# Patient Record
Sex: MALE | Race: WHITE | NOT HISPANIC OR LATINO | Employment: FULL TIME | URBAN - METROPOLITAN AREA
[De-identification: names, ages, dates, MRNs, and addresses within clinical notes are randomized per-mention and may not be internally consistent; named-entity substitution may affect disease eponyms.]

---

## 2017-10-06 ENCOUNTER — HOSPITAL ENCOUNTER (OUTPATIENT)
Dept: ULTRASOUND IMAGING | Facility: HOSPITAL | Age: 42
Discharge: HOME | End: 2017-10-06
Attending: INTERNAL MEDICINE | Admitting: INTERNAL MEDICINE

## 2017-10-06 LAB
ALBUMIN SERPL-MCNC: 3.8 G/DL (ref 3.5–4.8)
ALBUMIN/GLOB SERPL: 1.1 {RATIO} (ref 1–1.7)
ALP SERPL-CCNC: 60 IU/L (ref 32–91)
ALT SERPL-CCNC: 227 IU/L (ref 17–63)
ANA SER QL IA: NORMAL
ANION GAP SERPL CALC-SCNC: 13.1 MMOL/L (ref 10–20)
AST SERPL-CCNC: 210 IU/L (ref 15–41)
BASOPHILS # BLD AUTO: 0.1 10*3/UL (ref 0–0.2)
BASOPHILS NFR BLD AUTO: 1 % (ref 0–2)
BILIRUB SERPL-MCNC: 0.9 MG/DL (ref 0.3–1.2)
BUN SERPL-MCNC: 9 MG/DL (ref 8–20)
BUN/CREAT SERPL: 10 (ref 6.2–20.3)
CALCIUM SERPL-MCNC: 9.5 MG/DL (ref 8.9–10.3)
CERULOPLASMIN SERPL-MCNC: 42 MG/DL (ref 22–58)
CHLORIDE SERPL-SCNC: 103 MMOL/L (ref 101–111)
CONV AFP: 3 NG/ML (ref 0–9)
CONV CO2: 26 MMOL/L (ref 22–32)
CONV TOTAL PROTEIN: 7.3 G/DL (ref 6.1–7.9)
CREAT UR-MCNC: 0.9 MG/DL (ref 0.7–1.2)
DIFFERENTIAL METHOD BLD: (no result)
EOSINOPHIL # BLD AUTO: 0.3 10*3/UL (ref 0–0.3)
EOSINOPHIL # BLD AUTO: 3 % (ref 0–3)
ERYTHROCYTE [DISTWIDTH] IN BLOOD BY AUTOMATED COUNT: 12.7 % (ref 11.5–14.5)
GLOBULIN UR ELPH-MCNC: 3.5 G/DL (ref 2.5–3.8)
GLUCOSE SERPL-MCNC: 108 MG/DL (ref 65–99)
HAV IGM SERPL QL IA: NONREACTIVE
HBV CORE IGM SERPL QL IA: NONREACTIVE
HBV SURFACE AG SERPL QL IA: NONREACTIVE
HCT VFR BLD AUTO: 43.8 % (ref 40–54)
HCV AB SER DONR QL: NORMAL
HCV AB SER DONR QL: NORMAL
HGB BLD-MCNC: 14.7 G/DL (ref 14–18)
LYMPHOCYTES # BLD AUTO: 2.1 10*3/UL (ref 0.8–4.8)
LYMPHOCYTES NFR BLD AUTO: 24 % (ref 18–42)
MCH RBC QN AUTO: 32.3 PG (ref 26–32)
MCHC RBC AUTO-ENTMCNC: 33.6 G/DL (ref 32–36)
MCV RBC AUTO: 96.2 FL (ref 80–94)
MONOCYTES # BLD AUTO: 0.8 10*3/UL (ref 0.1–1.3)
MONOCYTES NFR BLD AUTO: 9 % (ref 2–11)
NEUTROPHILS # BLD AUTO: 5.6 10*3/UL (ref 2.3–8.6)
NEUTROPHILS NFR BLD AUTO: 63 % (ref 50–75)
NRBC BLD AUTO-RTO: 0 /100{WBCS}
NRBC/RBC NFR BLD MANUAL: 0 10*3/UL
PLATELET # BLD AUTO: 267 10*3/UL (ref 150–450)
PMV BLD AUTO: 8.5 FL (ref 7.4–10.4)
POTASSIUM SERPL-SCNC: 4.1 MMOL/L (ref 3.6–5.1)
RBC # BLD AUTO: 4.55 10*6/UL (ref 4.6–6)
SODIUM SERPL-SCNC: 138 MMOL/L (ref 136–144)
WBC # BLD AUTO: 9 10*3/UL (ref 4.5–11.5)

## 2017-10-17 ENCOUNTER — HOSPITAL ENCOUNTER (OUTPATIENT)
Dept: OTHER | Facility: HOSPITAL | Age: 42
Setting detail: SPECIMEN
Discharge: HOME | End: 2017-10-17
Attending: INTERNAL MEDICINE | Admitting: INTERNAL MEDICINE

## 2017-12-01 ENCOUNTER — HOSPITAL ENCOUNTER (OUTPATIENT)
Dept: LAB | Facility: HOSPITAL | Age: 42
Discharge: HOME | End: 2017-12-01
Attending: INTERNAL MEDICINE | Admitting: INTERNAL MEDICINE

## 2017-12-01 LAB
ALBUMIN SERPL-MCNC: 3.6 G/DL (ref 3.5–4.8)
ALBUMIN/GLOB SERPL: 1 {RATIO} (ref 1–1.7)
ALP SERPL-CCNC: 77 IU/L (ref 32–91)
ALT SERPL-CCNC: 184 IU/L (ref 17–63)
ANION GAP SERPL CALC-SCNC: 11 MMOL/L (ref 10–20)
AST SERPL-CCNC: 140 IU/L (ref 15–41)
BASOPHILS # BLD AUTO: 0.1 10*3/UL (ref 0–0.2)
BASOPHILS NFR BLD AUTO: 1 % (ref 0–2)
BILIRUB SERPL-MCNC: 0.4 MG/DL (ref 0.3–1.2)
BUN SERPL-MCNC: 11 MG/DL (ref 8–20)
BUN/CREAT SERPL: 13.8 (ref 6.2–20.3)
CALCIUM SERPL-MCNC: 9.5 MG/DL (ref 8.9–10.3)
CHLORIDE SERPL-SCNC: 102 MMOL/L (ref 101–111)
CONV CO2: 26 MMOL/L (ref 22–32)
CONV TOTAL PROTEIN: 7.3 G/DL (ref 6.1–7.9)
CREAT UR-MCNC: 0.8 MG/DL (ref 0.7–1.2)
DIFFERENTIAL METHOD BLD: (no result)
EOSINOPHIL # BLD AUTO: 0.3 10*3/UL (ref 0–0.3)
EOSINOPHIL # BLD AUTO: 3 % (ref 0–3)
ERYTHROCYTE [DISTWIDTH] IN BLOOD BY AUTOMATED COUNT: 13.5 % (ref 11.5–14.5)
FERRITIN SERPL-MCNC: 203 NG/ML (ref 24–336)
GLOBULIN UR ELPH-MCNC: 3.7 G/DL (ref 2.5–3.8)
GLUCOSE SERPL-MCNC: 276 MG/DL (ref 65–99)
HCT VFR BLD AUTO: 40.8 % (ref 40–54)
HGB BLD-MCNC: 14 G/DL (ref 14–18)
IRON SERPL-MCNC: 95 UG/DL (ref 45–182)
LYMPHOCYTES # BLD AUTO: 2 10*3/UL (ref 0.8–4.8)
LYMPHOCYTES NFR BLD AUTO: 25 % (ref 18–42)
MCH RBC QN AUTO: 33.5 PG (ref 26–32)
MCHC RBC AUTO-ENTMCNC: 34.5 G/DL (ref 32–36)
MCV RBC AUTO: 97.1 FL (ref 80–94)
MONOCYTES # BLD AUTO: 0.6 10*3/UL (ref 0.1–1.3)
MONOCYTES NFR BLD AUTO: 7 % (ref 2–11)
NEUTROPHILS # BLD AUTO: 5.1 10*3/UL (ref 2.3–8.6)
NEUTROPHILS NFR BLD AUTO: 64 % (ref 50–75)
NRBC BLD AUTO-RTO: 0 /100{WBCS}
NRBC/RBC NFR BLD MANUAL: 0 10*3/UL
PLATELET # BLD AUTO: 237 10*3/UL (ref 150–450)
PMV BLD AUTO: 8.4 FL (ref 7.4–10.4)
POTASSIUM SERPL-SCNC: 4 MMOL/L (ref 3.6–5.1)
RBC # BLD AUTO: 4.2 10*6/UL (ref 4.6–6)
SODIUM SERPL-SCNC: 135 MMOL/L (ref 136–144)
TRANSFERRIN SERPL-MCNC: 284.5 MG/DL (ref 180–330)
WBC # BLD AUTO: 8 10*3/UL (ref 4.5–11.5)

## 2017-12-03 LAB — ENDOMYSIUM IGA SER QL: NEGATIVE

## 2017-12-04 LAB
ANA SER QL IA: NORMAL

## 2018-10-15 ENCOUNTER — HOSPITAL ENCOUNTER (OUTPATIENT)
Dept: URGENT CARE | Facility: CLINIC | Age: 43
Discharge: HOME | End: 2018-10-15
Attending: FAMILY MEDICINE | Admitting: FAMILY MEDICINE

## 2023-09-11 ENCOUNTER — PATIENT ROUNDING (BHMG ONLY) (OUTPATIENT)
Dept: ORTHOPEDIC SURGERY | Facility: CLINIC | Age: 48
End: 2023-09-11
Payer: COMMERCIAL

## 2023-09-11 ENCOUNTER — OFFICE VISIT (OUTPATIENT)
Dept: PODIATRY | Facility: CLINIC | Age: 48
End: 2023-09-11
Payer: COMMERCIAL

## 2023-09-11 VITALS — BODY MASS INDEX: 32.93 KG/M2 | WEIGHT: 230 LBS | RESPIRATION RATE: 20 BRPM | HEIGHT: 70 IN

## 2023-09-11 DIAGNOSIS — M21.862 ACQUIRED POSTERIOR EQUINUS, LEFT: ICD-10-CM

## 2023-09-11 DIAGNOSIS — M79.672 LEFT FOOT PAIN: Primary | ICD-10-CM

## 2023-09-11 DIAGNOSIS — M76.72 PERONEAL TENDINITIS OF LEFT LOWER EXTREMITY: ICD-10-CM

## 2023-09-11 DIAGNOSIS — S92.355D CLOSED NONDISPLACED FRACTURE OF FIFTH METATARSAL BONE OF LEFT FOOT WITH ROUTINE HEALING, SUBSEQUENT ENCOUNTER: ICD-10-CM

## 2023-09-11 DIAGNOSIS — R25.2 CRAMPS OF LEFT LOWER EXTREMITY: ICD-10-CM

## 2023-09-11 PROCEDURE — 99203 OFFICE O/P NEW LOW 30 MIN: CPT | Performed by: PODIATRIST

## 2023-09-11 RX ORDER — CYCLOBENZAPRINE HCL 10 MG
TABLET ORAL
COMMUNITY
Start: 2017-05-27

## 2023-09-11 RX ORDER — IBUPROFEN 800 MG/1
TABLET ORAL
COMMUNITY

## 2023-09-11 RX ORDER — TRAMADOL HYDROCHLORIDE 50 MG/1
TABLET ORAL
COMMUNITY

## 2023-09-11 RX ORDER — PREGABALIN 225 MG/1
CAPSULE ORAL
COMMUNITY
Start: 2021-01-01

## 2023-09-11 RX ORDER — KETOROLAC TROMETHAMINE 5 MG/ML
SOLUTION OPHTHALMIC
COMMUNITY

## 2023-09-11 RX ORDER — HYDROCODONE BITARTRATE AND ACETAMINOPHEN 5; 325 MG/1; MG/1
TABLET ORAL
COMMUNITY

## 2023-09-11 RX ORDER — ATORVASTATIN CALCIUM 40 MG/1
TABLET, FILM COATED ORAL
COMMUNITY

## 2023-09-11 RX ORDER — LISINOPRIL 20 MG/1
20 TABLET ORAL DAILY
COMMUNITY

## 2023-09-11 RX ORDER — INDOMETHACIN 50 MG/1
CAPSULE ORAL
COMMUNITY
Start: 2018-10-15

## 2023-09-11 NOTE — PROGRESS NOTES
A My-Chart message has been sent to the patient for PATIENT ROUNDING with [facility]

## 2023-09-11 NOTE — PROGRESS NOTES
09/11/2023  Foot and Ankle Surgery - New Patient   Provider: Dr. Felipe Currie DPM  Location: [facility]    Subjective:  Negrito Blanca is a 47 y.o. male.     Chief Complaint   Patient presents with    Left Foot - Fracture    Initial Evaluation     LISA godfrey fn 2022  date   unknown       HPI: The patient is a 47-year-old male who presents to the clinic as a new patient for evaluation of issues involving the left foot.    The patient reports he sustained an injury to his left foot on 06/18/2023 when his foot fell asleep on the toilet. He states he stood up and it felt as if his foot twisted inside his shoe. He notes he did not realize his foot was asleep, and he did not fall down. The patient reports he proceeded to urgent care where x-rays were obtained, and he was given a boot. He recalls he was experiencing pain in the area of the fractures while wearing the boot; however, he was advised by orthopedics to continue wearing it. The patient notes he returned to orthopedics approximately 2 weeks ago secondary to continued pain. An MRI scan of his left ankle was obtained at [facility], and he was advised his issues should have improved as he was 6 weeks status post injury. At that time, the patient was advised to wear an ankle brace and return to normal activity. He states he was previously following with Dr. Irwin, who he saw approximately 3 weeks ago. He states he is unaware of any previous issues with his left foot prior to the injury; however, there was possibly evidence of an old fracture noted on x-ray.     Currently, the patient states he has not been ambulating on his left foot at all. He reports, at times, everything will tighten up and feels as if it is being squeezed. He notes he tried to move his left foot the other day and it locked up and felt like everything was just a pressure. The patient adds that he has attended some physical therapy.     The patient states his employer has  been accommodating sit-down duty at work.     No Known Allergies    Past Medical History:   Diagnosis Date    Difficulty walking     Foot fracture ankle sprain    Hypertension     Neuropathy in diabetes     Stress fracture        Past Surgical History:   Procedure Laterality Date    BACK SURGERY      WISDOM TOOTH EXTRACTION         History reviewed. No pertinent family history.    Social History     Socioeconomic History    Marital status: Single   Tobacco Use    Smoking status: Former     Types: Cigarettes     Quit date: 3/16/2016     Years since quittin.4    Smokeless tobacco: Former   Substance and Sexual Activity    Alcohol use: Not Currently    Drug use: Never    Sexual activity: Defer        Current Outpatient Medications on File Prior to Visit   Medication Sig Dispense Refill    atorvastatin (LIPITOR) 40 MG tablet atorvastatin 40 mg tablet      cyclobenzaprine (FLEXERIL) 10 MG tablet CYCLOBENZAPRINE HCL 10 MG TABS      HYDROcodone-acetaminophen (NORCO) 5-325 MG per tablet hydrocodone 5 mg-acetaminophen 325 mg tablet   TAKE 1 TABLET BY MOUTH TWICE DAILY AS NEEDED      ibuprofen (ADVIL,MOTRIN) 800 MG tablet ibuprofen 800 mg tablet   TAKE 1 TABLET BY MOUTH EVERY 8 HOURS AS NEEDED FOR PAIN      indomethacin (INDOCIN) 50 MG capsule INDOMETHACIN 50 MG CAPS      ketorolac (ACULAR) 0.5 % ophthalmic solution ketorolac 0.5 % eye drops   INSTILL 1 DROP IN RIGHT EYE FOUR TIMES DAILY FOR 5 DAYS      lisinopril (PRINIVIL,ZESTRIL) 20 MG tablet Take 1 tablet by mouth Daily.      metFORMIN (GLUCOPHAGE) 500 MG tablet metformin 500 mg tablet      pregabalin (LYRICA) 225 MG capsule       traMADol (ULTRAM) 50 MG tablet take 1 tablet by mouth every 4 to 6 hours      lidocaine (LIDODERM) 5 % Place 2 patches on the skin as directed by provider Daily. Remove & Discard patch within 12 hours or as directed by MD 10 patch 0     No current facility-administered medications on file prior to visit.       Review of  "Systems:  General: Denies fever, chills, fatigue, and weakness.  Eyes: Denies vision loss, blurry vision, and excessive redness.  ENT: Denies hearing issues and difficulty swallowing.  Cardiovascular: Denies palpitations, chest pain, or syncopal episodes.  Respiratory: Denies shortness of breath, wheezing, and coughing.  GI: Denies abdominal pain, nausea, and vomiting.   : Denies frequency, hematuria, and urgency.  Musculoskeletal: Denies muscle cramps, joint pains. Positive for left foot pain and stiffness.  Derm: Denies rash, open wounds, or suspicious lesions.  Neuro: Denies headaches, numbness, loss of coordination, and tremors.  Psych: Denies anxiety and depression.  Endocrine: Denies temperature intolerance and changes in appetite.  Heme: Denies bleeding disorders or abnormal bruising.     Objective   Resp 20   Ht 177.8 cm (70\")   Wt 104 kg (230 lb)   BMI 33.00 kg/m²     Foot/Ankle Exam    GENERAL  Orientation:  AAOx3  Affect:  appropriate    VASCULAR     Left Foot Vascularity   Normal vascular exam    Dorsalis pedis:  2+  Posterior tibial:  2+  Skin temperature:  warm  Edema grading:  None  CFT:  < 3 seconds  Pedal hair growth:  Present  Varicosities:  none     NEUROLOGIC     Left Foot Neurologic   Light touch sensation: normal  Hot/Cold sensation:  normal  Achilles reflex:  2+    MUSCULOSKELETAL     Left Foot Musculoskeletal   Arch:  Normal    MUSCLE STRENGTH     Left Foot Muscle Strength   Normal strength    Foot dorsiflexion:  5  Foot plantar flexion:  5  Foot inversion:  5  Foot eversion:  5    DERMATOLOGIC      Right Foot Dermatologic   Nails comment:  Nails 1-5     Left Foot Dermatologic   Skin  Left foot skin is intact.   Nails comment:  Nails 1-5     Left foot additional comments: Diffuse discomfort with palpation involving the lateral aspect of the left foot. Pain along the arch and plantar aspect of the heel with moderate-to-severe soft tissue rigidity involving the foot as well as equinus " contracture. No obvious deformity or instability. Guarding noted with range-of-motion and muscle strength testing.    Assessment & Plan   Diagnoses and all orders for this visit:    1. Left foot pain (Primary)  -     XR Foot 3+ View Left  -     XR Ankle 3+ View Left    2. Closed nondisplaced fracture of fifth metatarsal bone of left foot with routine healing, subsequent encounter  -     XR Foot 3+ View Left  -     XR Ankle 3+ View Left    3. Peroneal tendinitis of left lower extremity  -     Ambulatory Referral to Physical Therapy Evaluate and treat    4. Cramps of left lower extremity  -     Ambulatory Referral to Physical Therapy Evaluate and treat    5. Acquired posterior equinus, left  -     Ambulatory Referral to Physical Therapy Evaluate and treat      The patient presents to the office today as a new patient for evaluation of left foot pain. MRI scan of the left foot, obtained previously at an outside facility, was independently reviewed. Additionally, x-rays of the left foot and ankle, obtained in 06/2023, were reviewed and compared to x-rays of the left foot and ankle, obtained today. He has significant disuse atrophy as well as soft tissue disruption involving the lateral aspect of the foot. There is evidence of fractures with partial consolidation which appear stable. Imaging, diagnosis, and treatment options were discussed at length with the patient. Advised he is healing well overall, but secondary to disuse, his bone has slightly demineralized which will produce pain with weight-bearing. Advised he is now prone to a stress fracture. Additionally, discussed possible complications such as nerve issues including complex regional pain syndrome. Recommended attending physical therapy, with referral provided, and transitioning into a regular boot without crutches. Recommended performing range-of-motion exercises at least 3 times daily as well as massage, with a handout provided regarding the stretching  exercises. Advised this will be exceptionally tight and uncomfortable given that he is over 8 weeks status post fracture. Recommended he attempt to transition into regular, supportive shoes in approximately 2 weeks. Advised against ambulating while barefoot or in flats, sandals, or flip-flops. Recommended rest, ice, compression, elevation, and anti-inflammatories as well as warm water Epsom salt soaks for symptom management. The patient will return to the office in 4 weeks for re-evaluation. Ideally, would like him to return in a regular shoe. Returning in the boot without crutches is acceptable. Greater than 30 minutes was spent before, during, and after evaluation for patient care.    Orders Placed This Encounter   Procedures    XR Foot 3+ View Left     Order Specific Question:   Reason for Exam:     Answer:   lateral pain closed nondisplaced fx 5th  metatarsal  room 10   wb     Order Specific Question:   Does this patient have a diabetic monitoring/medication delivering device on?     Answer:   No     Order Specific Question:   Release to patient     Answer:   Routine Release [[id number]]    XR Ankle 3+ View Left     Order Specific Question:   Reason for Exam:     Answer:   doral pain  room 10  wb     Order Specific Question:   Does this patient have a diabetic monitoring/medication delivering device on?     Answer:   No     Order Specific Question:   Release to patient     Answer:   Routine Release [[id number]]    Ambulatory Referral to Physical Therapy Evaluate and treat     Referral Priority:   Routine     Referral Type:   Physical Therapy     Referral Reason:   Specialty Services Required     Referral Location:   [facility]     Requested Specialty:   Physical Therapy     Number of Visits Requested:   1        Note is dictated utilizing voice recognition software. Unfortunately this leads to occasional typographical errors. I apologize in advance if the situation occurs. If  questions occur please do not hesitate to call our office.    Transcribed from ambient dictation for PETER Currie DPM by Francia Rob.  09/11/23   11:44 EDT    Patient or patient representative verbalized consent to the visit recording.  I have personally performed the services described in this document as transcribed by the above individual, and it is both accurate and complete.

## 2023-10-09 ENCOUNTER — OFFICE VISIT (OUTPATIENT)
Dept: PODIATRY | Facility: CLINIC | Age: 48
End: 2023-10-09
Payer: COMMERCIAL

## 2023-10-09 VITALS — HEIGHT: 70 IN | WEIGHT: 230 LBS | OXYGEN SATURATION: 97 % | BODY MASS INDEX: 32.93 KG/M2 | HEART RATE: 93 BPM

## 2023-10-09 DIAGNOSIS — M76.72 PERONEAL TENDINITIS OF LEFT LOWER EXTREMITY: ICD-10-CM

## 2023-10-09 DIAGNOSIS — S92.355D CLOSED NONDISPLACED FRACTURE OF FIFTH METATARSAL BONE OF LEFT FOOT WITH ROUTINE HEALING, SUBSEQUENT ENCOUNTER: ICD-10-CM

## 2023-10-09 DIAGNOSIS — R25.2 CRAMPS OF LEFT LOWER EXTREMITY: ICD-10-CM

## 2023-10-09 DIAGNOSIS — M21.862 ACQUIRED POSTERIOR EQUINUS, LEFT: ICD-10-CM

## 2023-10-09 DIAGNOSIS — M79.672 LEFT FOOT PAIN: Primary | ICD-10-CM

## 2023-10-09 DIAGNOSIS — M85.80 OSTEOPENIA DUE TO DISUSE: ICD-10-CM

## 2023-10-09 PROCEDURE — 99213 OFFICE O/P EST LOW 20 MIN: CPT | Performed by: PODIATRIST

## 2023-10-09 NOTE — PROGRESS NOTES
"10/09/2023  Foot and Ankle Surgery - Established Patient/Follow-up  Provider: Dr. Felipe Currie DPM  Location: [facility]    Subjective:  Negrito Blanca is a 47 y.o. male.     Chief Complaint   Patient presents with    Left Foot - Follow-up, Fracture     Closed nondisplaced fracture of fifth metatarsal bone of left foot with routine healing    Follow-up     MARIELY RavinderClau FN 01/15/2023       HPI: The patient is a 47-year-old male who returns to the office today for a follow-up regarding his left foot.    The patient states the fracture site to the lateral aspect of the foot feels improved; however, he continues to use crutches secondary to pain to the medial anterior aspect of the foot and ankle. He reports limited range of motion to this area as well. He notes he has been non-weightbearing with his left foot for approximately 3 months, but he has been attempting to bear weight with and without his walker, but has difficulty with lifting his foot up. The patient states he has attended 2 physical therapy sessions. He denies wearing inserts in his shoe as he recently obtained [employer] shoes, but he does wear a lace-up ankle brace. He states he feels as if his foot falls when he puts weight on it.     No Known Allergies    Current Outpatient Medications on File Prior to Visit   Medication Sig Dispense Refill    ibuprofen (ADVIL,MOTRIN) 800 MG tablet ibuprofen 800 mg tablet   TAKE 1 TABLET BY MOUTH EVERY 8 HOURS AS NEEDED FOR PAIN      lisinopril (PRINIVIL,ZESTRIL) 20 MG tablet Take 1 tablet by mouth Daily.      metFORMIN (GLUCOPHAGE) 500 MG tablet metformin 500 mg tablet      pregabalin (LYRICA) 225 MG capsule        No current facility-administered medications on file prior to visit.       Objective   Pulse 93   Ht 177.8 cm (70\")   Wt 104 kg (230 lb)   SpO2 97%   BMI 33.00 kg/mý     Foot/Ankle Exam    GENERAL  Orientation:  AAOx3  Affect:  appropriate    VASCULAR     Left Foot Vascularity   Normal " vascular exam    Dorsalis pedis:  2+  Posterior tibial:  2+  Skin temperature:  warm  Edema grading:  None  CFT:  < 3 seconds  Pedal hair growth:  Present  Varicosities:  none     NEUROLOGIC     Left Foot Neurologic   Light touch sensation: normal  Hot/Cold sensation:  normal  Achilles reflex:  2+    MUSCULOSKELETAL     Left Foot Musculoskeletal   Arch:  Normal    MUSCLE STRENGTH     Left Foot Muscle Strength   Normal strength    Foot dorsiflexion:  5  Foot plantar flexion:  5  Foot inversion:  5  Foot eversion:  5    DERMATOLOGIC      Right Foot Dermatologic   Nails comment:  Nails 1-5     Left Foot Dermatologic   Skin  Left foot skin is intact.   Nails comment:  Nails 1-5     Left foot additional comments: Diffuse discomfort with palpation involving the lateral aspect of the left foot. Pain along the arch and plantar aspect of the heel with moderate-to-severe soft tissue rigidity involving the foot as well as equinus contracture. No obvious deformity or instability. Guarding noted with range-of-motion and muscle strength testing.    10/09/2023  Range of motion and muscle strength have improved since last visit. Continued discomfort with palpation to the foot now on the medial anterior aspect of the foot and ankle, lateral aspect of the foot has improved.      Assessment & Plan   Diagnoses and all orders for this visit:    1. Left foot pain (Primary)    2. Peroneal tendinitis of left lower extremity    3. Cramps of left lower extremity    4. Acquired posterior equinus, left    5. Closed nondisplaced fracture of fifth metatarsal bone of left foot with routine healing, subsequent encounter  -     XR Foot 3+ View Left    6. Osteopenia due to disuse      Patient returns for follow-up regarding his left foot. X-rays of the left foot, obtained today, were independently reviewed revealing significant disuse osteopenia. The lateral aspect of the foot has improved since his last visit, although he does have discomfort with  palpation to the medial anterior aspect of the left foot and ankle. Discussed pathophysiology of disuse osteopenia. Discussed he is at an elevated risk of sustaining a stress fracture given the disuse osteopenia and his size. Advised recovery will be a time-based process. Recommended transitioning away from crutches and wearing tennis shoes with over-the-counter inserts and a lace-up ankle brace. Recommended continuing with physical therapy. Advised steroid injections are not indicated at this time as they may help with discomfort; however, they will increase the risk of a stress fracture. The patient will return to the office in 6 weeks for re-evaluation. Repeat x-rays are not necessary at that time. Greater than 20 minutes was spent before, during, and after evaluation for patient care.      Orders Placed This Encounter   Procedures    XR Foot 3+ View Left     Order Specific Question:   Reason for Exam:     Answer:   Closed nondisplaced fracture of fifth metatarsal bone of left foot with routine healing rm 13 wb     Order Specific Question:   Does this patient have a diabetic monitoring/medication delivering device on?     Answer:   No     Order Specific Question:   Release to patient     Answer:   Routine Release [[id number]]          Note is dictated utilizing voice recognition software. Unfortunately this leads to occasional typographical errors. I apologize in advance if the situation occurs. If questions occur please do not hesitate to call our office.    Transcribed from ambient dictation for PETER Currie DPM by Francia Rob.  10/09/23   11:06 EDT    Patient or patient representative verbalized consent to the visit recording.  I have personally performed the services described in this document as transcribed by the above individual, and it is both accurate and complete.

## 2023-11-20 ENCOUNTER — OFFICE VISIT (OUTPATIENT)
Dept: PODIATRY | Facility: CLINIC | Age: 48
End: 2023-11-20
Payer: COMMERCIAL

## 2023-11-20 VITALS — RESPIRATION RATE: 18 BRPM | HEIGHT: 70 IN | WEIGHT: 229 LBS | BODY MASS INDEX: 32.78 KG/M2

## 2023-11-20 DIAGNOSIS — M21.862 ACQUIRED POSTERIOR EQUINUS, LEFT: ICD-10-CM

## 2023-11-20 DIAGNOSIS — M79.672 LEFT FOOT PAIN: Primary | ICD-10-CM

## 2023-11-20 DIAGNOSIS — S92.355D CLOSED NONDISPLACED FRACTURE OF FIFTH METATARSAL BONE OF LEFT FOOT WITH ROUTINE HEALING, SUBSEQUENT ENCOUNTER: ICD-10-CM

## 2023-11-20 DIAGNOSIS — R25.2 CRAMPS OF LEFT LOWER EXTREMITY: ICD-10-CM

## 2023-11-20 DIAGNOSIS — M76.72 PERONEAL TENDINITIS OF LEFT LOWER EXTREMITY: ICD-10-CM

## 2023-11-20 RX ORDER — ATORVASTATIN CALCIUM 40 MG/1
40 TABLET, FILM COATED ORAL EVERY EVENING
COMMUNITY
Start: 2023-10-16

## 2023-11-20 RX ORDER — BUPROPION HYDROCHLORIDE 75 MG/1
1 TABLET ORAL EVERY 12 HOURS SCHEDULED
COMMUNITY
Start: 2023-11-17

## 2023-11-20 NOTE — PROGRESS NOTES
"11/20/2023  Foot and Ankle Surgery - Established Patient/Follow-up  Provider: Dr. Felipe Currie DPM  Location: [facility]    Subjective:  Negrito Blanca is a 47 y.o. male.     Chief Complaint   Patient presents with    Left Foot - Follow-up       HPI: The patient is a 47-year-old male who returns for follow-up involving the left lower extremity.    He reports overall improvement to his left lower extremity. However, he notes mild pain in his left knee. The patient states he has been using a cane for ambulation. He notes he is still attending physical therapy. He states he attempted to ambulate on his left lower extremity this weekend. The patient reports he was using a knee scooter for 3 months.    No Known Allergies    Current Outpatient Medications on File Prior to Visit   Medication Sig Dispense Refill    atorvastatin (LIPITOR) 40 MG tablet Take 1 tablet by mouth Every Evening.      buPROPion (WELLBUTRIN) 75 MG tablet Take 1 tablet by mouth Every 12 (Twelve) Hours.      lisinopril (PRINIVIL,ZESTRIL) 20 MG tablet Take 1 tablet by mouth Daily.      metFORMIN (GLUCOPHAGE) 500 MG tablet metformin 500 mg tablet      pregabalin (LYRICA) 225 MG capsule       ibuprofen (ADVIL,MOTRIN) 800 MG tablet ibuprofen 800 mg tablet   TAKE 1 TABLET BY MOUTH EVERY 8 HOURS AS NEEDED FOR PAIN       No current facility-administered medications on file prior to visit.       Objective   Resp 18   Ht 177.8 cm (70\")   Wt 104 kg (229 lb)   BMI 32.86 kg/m²     Foot/Ankle Exam  GENERAL  Orientation:  AAOx3  Affect:  appropriate    VASCULAR     Left Foot Vascularity   Normal vascular exam    Dorsalis pedis:  2+  Posterior tibial:  2+  Skin temperature:  warm  Edema grading:  None  CFT:  < 3 seconds  Pedal hair growth:  Present  Varicosities:  none     NEUROLOGIC     Left Foot Neurologic   Light touch sensation: normal  Hot/Cold sensation:  normal  Achilles reflex:  2+    MUSCULOSKELETAL     Left Foot Musculoskeletal   Arch:  " Normal    MUSCLE STRENGTH     Left Foot Muscle Strength   Normal strength    Foot dorsiflexion:  5  Foot plantar flexion:  5  Foot inversion:  5  Foot eversion:  5    DERMATOLOGIC      Right Foot Dermatologic   Nails comment:  Nails 1-5     Left Foot Dermatologic   Skin  Left foot skin is intact.   Nails comment:  Nails 1-5     Left foot additional comments: Diffuse discomfort with palpation involving the lateral aspect of the left foot. Pain along the arch and plantar aspect of the heel with moderate-to-severe soft tissue rigidity involving the foot as well as equinus contracture. No obvious deformity or instability. Guarding noted with range-of-motion and muscle strength testing.    10/09/2023  Range of motion and muscle strength have improved since last visit. Continued discomfort with palpation to the foot now on the medial anterior aspect of the foot and ankle, lateral aspect of the foot has improved.    11/20/2023: Range of motion has improved. No significant signs of inflammation. No other complicating features.    Assessment & Plan   Diagnoses and all orders for this visit:    1. Left foot pain (Primary)    2. Peroneal tendinitis of left lower extremity    3. Cramps of left lower extremity    4. Acquired posterior equinus, left    5. Closed nondisplaced fracture of fifth metatarsal bone of left foot with routine healing, subsequent encounter        The patient is a 47-year-old male who presents to the office today for a follow-up involving the left lower extremity. His range of motion has improved. No significant signs of inflammation. No other complicating features. I advised the patient to work completely away from the ankle brace to support the area. If his knee continues to bother him, we will refer him to a knee specialist. I advised the patient to perform low-weight, high repetition exercises from lifting or strengthening exercises such as biking, elliptical, and swimming. I advised the patient to  avoid uneven terrain. The patient will return to the office as needed. Greater than 20 minutes was spent before, during, and after evaluation for patient care.    No orders of the defined types were placed in this encounter.         Note is dictated utilizing voice recognition software. Unfortunately this leads to occasional typographical errors. I apologize in advance if the situation occurs. If questions occur please do not hesitate to call our office.    Transcribed from ambient dictation for PETER Currie DPM by Nivia Lyman.  11/20/23   10:23 EST    Patient or patient representative verbalized consent to the visit recording.  I have personally performed the services described in this document as transcribed by the above individual, and it is both accurate and complete.

## 2023-12-28 ENCOUNTER — APPOINTMENT (OUTPATIENT)
Dept: ULTRASOUND IMAGING | Facility: HOSPITAL | Age: 48
End: 2023-12-28
Payer: COMMERCIAL

## 2023-12-28 ENCOUNTER — HOSPITAL ENCOUNTER (OUTPATIENT)
Facility: HOSPITAL | Age: 48
Setting detail: OBSERVATION
Discharge: HOME | End: 2023-12-29
Attending: EMERGENCY MEDICINE | Admitting: EMERGENCY MEDICINE
Payer: COMMERCIAL

## 2023-12-28 ENCOUNTER — APPOINTMENT (OUTPATIENT)
Dept: MRI IMAGING | Facility: HOSPITAL | Age: 48
End: 2023-12-28
Payer: COMMERCIAL

## 2023-12-28 ENCOUNTER — APPOINTMENT (OUTPATIENT)
Dept: GENERAL RADIOLOGY | Facility: HOSPITAL | Age: 48
End: 2023-12-28
Payer: COMMERCIAL

## 2023-12-28 DIAGNOSIS — R06.01 ORTHOPNEA: ICD-10-CM

## 2023-12-28 DIAGNOSIS — R00.2 PALPITATIONS: ICD-10-CM

## 2023-12-28 DIAGNOSIS — R06.00 DYSPNEA, UNSPECIFIED TYPE: Primary | ICD-10-CM

## 2023-12-28 DIAGNOSIS — R10.11 RIGHT UPPER QUADRANT ABDOMINAL PAIN: ICD-10-CM

## 2023-12-28 DIAGNOSIS — R74.8 ELEVATED LIVER ENZYMES: ICD-10-CM

## 2023-12-28 LAB
ALBUMIN SERPL-MCNC: 4.5 G/DL (ref 3.5–5.2)
ALBUMIN/GLOB SERPL: 1.3 G/DL
ALP SERPL-CCNC: 116 U/L (ref 39–117)
ALT SERPL W P-5'-P-CCNC: 92 U/L (ref 1–41)
ANION GAP SERPL CALCULATED.3IONS-SCNC: 12 MMOL/L (ref 5–15)
APTT PPP: 28.8 SECONDS (ref 61–76.5)
AST SERPL-CCNC: 71 U/L (ref 1–40)
BASOPHILS # BLD AUTO: 0.1 10*3/MM3 (ref 0–0.2)
BASOPHILS NFR BLD AUTO: 1 % (ref 0–1.5)
BILIRUB SERPL-MCNC: 0.8 MG/DL (ref 0–1.2)
BILIRUB UR QL STRIP: NEGATIVE
BUN SERPL-MCNC: 9 MG/DL (ref 6–20)
BUN/CREAT SERPL: 11.4 (ref 7–25)
CALCIUM SPEC-SCNC: 10.4 MG/DL (ref 8.6–10.5)
CHLORIDE SERPL-SCNC: 100 MMOL/L (ref 98–107)
CLARITY UR: CLEAR
CO2 SERPL-SCNC: 27 MMOL/L (ref 22–29)
COLOR UR: YELLOW
CREAT SERPL-MCNC: 0.79 MG/DL (ref 0.76–1.27)
D DIMER PPP FEU-MCNC: 0.27 MG/L (FEU) (ref 0–0.5)
DEPRECATED RDW RBC AUTO: 46.4 FL (ref 37–54)
EGFRCR SERPLBLD CKD-EPI 2021: 108.2 ML/MIN/1.73
EOSINOPHIL # BLD AUTO: 0.1 10*3/MM3 (ref 0–0.4)
EOSINOPHIL NFR BLD AUTO: 0.9 % (ref 0.3–6.2)
ERYTHROCYTE [DISTWIDTH] IN BLOOD BY AUTOMATED COUNT: 13.5 % (ref 12.3–15.4)
GEN 5 2HR TROPONIN T REFLEX: 8 NG/L
GLOBULIN UR ELPH-MCNC: 3.4 GM/DL
GLUCOSE BLDC GLUCOMTR-MCNC: 157 MG/DL (ref 70–105)
GLUCOSE BLDC GLUCOMTR-MCNC: 92 MG/DL (ref 70–105)
GLUCOSE SERPL-MCNC: 110 MG/DL (ref 65–99)
GLUCOSE UR STRIP-MCNC: NEGATIVE MG/DL
HAV IGM SERPL QL IA: NORMAL
HBV CORE IGM SERPL QL IA: NORMAL
HBV SURFACE AG SERPL QL IA: NORMAL
HCT VFR BLD AUTO: 44.8 % (ref 37.5–51)
HCV AB SER DONR QL: NORMAL
HGB BLD-MCNC: 15.2 G/DL (ref 13–17.7)
HGB UR QL STRIP.AUTO: NEGATIVE
HOLD SPECIMEN: NORMAL
INR PPP: 1.01 (ref 0.93–1.1)
KETONES UR QL STRIP: NEGATIVE
LEUKOCYTE ESTERASE UR QL STRIP.AUTO: NEGATIVE
LIPASE SERPL-CCNC: 35 U/L (ref 13–60)
LYMPHOCYTES # BLD AUTO: 2.6 10*3/MM3 (ref 0.7–3.1)
LYMPHOCYTES NFR BLD AUTO: 22.7 % (ref 19.6–45.3)
MAGNESIUM SERPL-MCNC: 1.9 MG/DL (ref 1.6–2.6)
MCH RBC QN AUTO: 31.8 PG (ref 26.6–33)
MCHC RBC AUTO-ENTMCNC: 34 G/DL (ref 31.5–35.7)
MCV RBC AUTO: 93.3 FL (ref 79–97)
MONOCYTES # BLD AUTO: 0.9 10*3/MM3 (ref 0.1–0.9)
MONOCYTES NFR BLD AUTO: 8 % (ref 5–12)
NEUTROPHILS NFR BLD AUTO: 67.4 % (ref 42.7–76)
NEUTROPHILS NFR BLD AUTO: 7.6 10*3/MM3 (ref 1.7–7)
NITRITE UR QL STRIP: NEGATIVE
NRBC BLD AUTO-RTO: 0.1 /100 WBC (ref 0–0.2)
NT-PROBNP SERPL-MCNC: <36 PG/ML (ref 0–900)
PH UR STRIP.AUTO: 7 [PH] (ref 5–8)
PLATELET # BLD AUTO: 266 10*3/MM3 (ref 140–450)
PMV BLD AUTO: 8.3 FL (ref 6–12)
POTASSIUM SERPL-SCNC: 4.2 MMOL/L (ref 3.5–5.2)
PROT SERPL-MCNC: 7.9 G/DL (ref 6–8.5)
PROT UR QL STRIP: NEGATIVE
PROTHROMBIN TIME: 11 SECONDS (ref 9.6–11.7)
RBC # BLD AUTO: 4.8 10*6/MM3 (ref 4.14–5.8)
SODIUM SERPL-SCNC: 139 MMOL/L (ref 136–145)
SP GR UR STRIP: 1.01 (ref 1–1.03)
TROPONIN T DELTA: NORMAL
TROPONIN T SERPL HS-MCNC: <6 NG/L
TSH SERPL DL<=0.05 MIU/L-ACNC: 1.69 UIU/ML (ref 0.27–4.2)
UROBILINOGEN UR QL STRIP: NORMAL
WBC NRBC COR # BLD AUTO: 11.3 10*3/MM3 (ref 3.4–10.8)
WHOLE BLOOD HOLD COAG: NORMAL
WHOLE BLOOD HOLD SPECIMEN: NORMAL

## 2023-12-28 PROCEDURE — 83735 ASSAY OF MAGNESIUM: CPT | Performed by: EMERGENCY MEDICINE

## 2023-12-28 PROCEDURE — 83880 ASSAY OF NATRIURETIC PEPTIDE: CPT | Performed by: EMERGENCY MEDICINE

## 2023-12-28 PROCEDURE — 85610 PROTHROMBIN TIME: CPT | Performed by: EMERGENCY MEDICINE

## 2023-12-28 PROCEDURE — 93005 ELECTROCARDIOGRAM TRACING: CPT

## 2023-12-28 PROCEDURE — G0378 HOSPITAL OBSERVATION PER HR: HCPCS

## 2023-12-28 PROCEDURE — 99285 EMERGENCY DEPT VISIT HI MDM: CPT

## 2023-12-28 PROCEDURE — 25010000002 GADOTERIDOL PER 1 ML: Performed by: EMERGENCY MEDICINE

## 2023-12-28 PROCEDURE — 81003 URINALYSIS AUTO W/O SCOPE: CPT | Performed by: EMERGENCY MEDICINE

## 2023-12-28 PROCEDURE — 70553 MRI BRAIN STEM W/O & W/DYE: CPT

## 2023-12-28 PROCEDURE — 83690 ASSAY OF LIPASE: CPT | Performed by: EMERGENCY MEDICINE

## 2023-12-28 PROCEDURE — 85379 FIBRIN DEGRADATION QUANT: CPT | Performed by: EMERGENCY MEDICINE

## 2023-12-28 PROCEDURE — 85730 THROMBOPLASTIN TIME PARTIAL: CPT | Performed by: EMERGENCY MEDICINE

## 2023-12-28 PROCEDURE — 84484 ASSAY OF TROPONIN QUANT: CPT | Performed by: EMERGENCY MEDICINE

## 2023-12-28 PROCEDURE — 76705 ECHO EXAM OF ABDOMEN: CPT

## 2023-12-28 PROCEDURE — A9579 GAD-BASE MR CONTRAST NOS,1ML: HCPCS | Performed by: EMERGENCY MEDICINE

## 2023-12-28 PROCEDURE — 93005 ELECTROCARDIOGRAM TRACING: CPT | Performed by: EMERGENCY MEDICINE

## 2023-12-28 PROCEDURE — 71045 X-RAY EXAM CHEST 1 VIEW: CPT

## 2023-12-28 PROCEDURE — 82948 REAGENT STRIP/BLOOD GLUCOSE: CPT

## 2023-12-28 PROCEDURE — 80074 ACUTE HEPATITIS PANEL: CPT | Performed by: PHYSICIAN ASSISTANT

## 2023-12-28 PROCEDURE — 80050 GENERAL HEALTH PANEL: CPT | Performed by: EMERGENCY MEDICINE

## 2023-12-28 RX ORDER — ALUMINA, MAGNESIA, AND SIMETHICONE 2400; 2400; 240 MG/30ML; MG/30ML; MG/30ML
15 SUSPENSION ORAL EVERY 6 HOURS PRN
Status: DISCONTINUED | OUTPATIENT
Start: 2023-12-28 | End: 2023-12-29 | Stop reason: HOSPADM

## 2023-12-28 RX ORDER — NICOTINE POLACRILEX 4 MG
15 LOZENGE BUCCAL
Status: DISCONTINUED | OUTPATIENT
Start: 2023-12-28 | End: 2023-12-29 | Stop reason: HOSPADM

## 2023-12-28 RX ORDER — ATORVASTATIN CALCIUM 40 MG/1
40 TABLET, FILM COATED ORAL EVERY EVENING
Status: DISCONTINUED | OUTPATIENT
Start: 2023-12-28 | End: 2023-12-29 | Stop reason: HOSPADM

## 2023-12-28 RX ORDER — ONDANSETRON 4 MG/1
4 TABLET, FILM COATED ORAL EVERY 6 HOURS PRN
Status: DISCONTINUED | OUTPATIENT
Start: 2023-12-28 | End: 2023-12-29 | Stop reason: HOSPADM

## 2023-12-28 RX ORDER — SODIUM CHLORIDE 9 MG/ML
40 INJECTION, SOLUTION INTRAVENOUS AS NEEDED
Status: DISCONTINUED | OUTPATIENT
Start: 2023-12-28 | End: 2023-12-29 | Stop reason: HOSPADM

## 2023-12-28 RX ORDER — LISINOPRIL 5 MG/1
5 TABLET ORAL DAILY
Status: DISCONTINUED | OUTPATIENT
Start: 2023-12-28 | End: 2023-12-29 | Stop reason: HOSPADM

## 2023-12-28 RX ORDER — SODIUM CHLORIDE 0.9 % (FLUSH) 0.9 %
10 SYRINGE (ML) INJECTION AS NEEDED
Status: DISCONTINUED | OUTPATIENT
Start: 2023-12-28 | End: 2023-12-29 | Stop reason: HOSPADM

## 2023-12-28 RX ORDER — ACETAMINOPHEN 325 MG/1
650 TABLET ORAL EVERY 4 HOURS PRN
Status: DISCONTINUED | OUTPATIENT
Start: 2023-12-28 | End: 2023-12-29 | Stop reason: HOSPADM

## 2023-12-28 RX ORDER — INSULIN LISPRO 100 [IU]/ML
2-7 INJECTION, SOLUTION INTRAVENOUS; SUBCUTANEOUS
Status: DISCONTINUED | OUTPATIENT
Start: 2023-12-28 | End: 2023-12-29 | Stop reason: HOSPADM

## 2023-12-28 RX ORDER — ONDANSETRON 2 MG/ML
4 INJECTION INTRAMUSCULAR; INTRAVENOUS EVERY 6 HOURS PRN
Status: DISCONTINUED | OUTPATIENT
Start: 2023-12-28 | End: 2023-12-29 | Stop reason: HOSPADM

## 2023-12-28 RX ORDER — DEXTROSE MONOHYDRATE 25 G/50ML
25 INJECTION, SOLUTION INTRAVENOUS
Status: DISCONTINUED | OUTPATIENT
Start: 2023-12-28 | End: 2023-12-29 | Stop reason: HOSPADM

## 2023-12-28 RX ORDER — IBUPROFEN 600 MG/1
1 TABLET ORAL
Status: DISCONTINUED | OUTPATIENT
Start: 2023-12-28 | End: 2023-12-29 | Stop reason: HOSPADM

## 2023-12-28 RX ORDER — SODIUM CHLORIDE 0.9 % (FLUSH) 0.9 %
10 SYRINGE (ML) INJECTION EVERY 12 HOURS SCHEDULED
Status: DISCONTINUED | OUTPATIENT
Start: 2023-12-28 | End: 2023-12-29 | Stop reason: HOSPADM

## 2023-12-28 RX ORDER — PREGABALIN 75 MG/1
225 CAPSULE ORAL 2 TIMES DAILY
Status: DISCONTINUED | OUTPATIENT
Start: 2023-12-28 | End: 2023-12-29 | Stop reason: HOSPADM

## 2023-12-28 RX ADMIN — Medication 10 ML: at 21:13

## 2023-12-28 RX ADMIN — Medication 10 ML: at 17:54

## 2023-12-28 RX ADMIN — LISINOPRIL 5 MG: 5 TABLET ORAL at 17:57

## 2023-12-28 RX ADMIN — ATORVASTATIN CALCIUM 40 MG: 40 TABLET, FILM COATED ORAL at 17:57

## 2023-12-28 RX ADMIN — GADOTERIDOL 20 ML: 279.3 INJECTION, SOLUTION INTRAVENOUS at 17:09

## 2023-12-28 NOTE — ED PROVIDER NOTES
"Subjective   History of Present Illness  Chief complaint: Patient is a 48-year-old who presents with 10 days worth of increasing episodes of shortness of breath.  States that they come and go randomly.  He has noticed palpitations as well.  He has not had chest pain.  He has had loss of appetite and some fatigue.  He states that the symptoms seem to start after being on Wellbutrin.  He has stopped that medication for the last 3 days and is still having the symptoms he presented here for evaluation.  He was seen at the [facility].  He was told his liver tests were elevated and he has had some right upper quadrant discomfort.  He has a history of hypertension hyperlipidemia but recently just started taking his hyperlipidemia medication.  He is a former smoker.  He also states that for a week he has had a smell of \"smoke\" in his nostrils.  Has been persistent every time he breathes in.    Context:    Duration:    Timing:    Severity:    Associated Symptoms:        PCP:  LMP:      Review of Systems   Constitutional:  Positive for appetite change and fatigue.   Respiratory:  Positive for shortness of breath.    Cardiovascular:  Positive for palpitations. Negative for chest pain.   Gastrointestinal:  Positive for abdominal pain.   Genitourinary: Negative.    Musculoskeletal: Negative.        Past Medical History:   Diagnosis Date    Difficulty walking     Foot fracture ankle sprain    Hypertension     Neuropathy in diabetes     Stress fracture        No Known Allergies    Past Surgical History:   Procedure Laterality Date    BACK SURGERY      WISDOM TOOTH EXTRACTION         No family history on file.    Social History     Socioeconomic History    Marital status: Single   Tobacco Use    Smoking status: Former     Types: Cigarettes     Quit date: 3/16/2016     Years since quittin.7    Smokeless tobacco: Former   Vaping Use    Vaping Use: Never used   Substance and Sexual Activity    Alcohol use: " Not Currently    Drug use: Never    Sexual activity: Defer           Objective   Physical Exam  Vitals and nursing note reviewed.   Constitutional:       General: He is not in acute distress.  HENT:      Head: Normocephalic and atraumatic.   Cardiovascular:      Rate and Rhythm: Normal rate and regular rhythm.   Pulmonary:      Effort: Pulmonary effort is normal.      Breath sounds: Normal breath sounds.   Abdominal:      General: Abdomen is protuberant.      Palpations: Abdomen is soft.      Tenderness: There is abdominal tenderness in the right upper quadrant.       Neurological:      General: No focal deficit present.      Mental Status: He is alert and oriented to person, place, and time.   Psychiatric:         Mood and Affect: Mood normal.         Behavior: Behavior normal.         Procedures           ED Course                               XR Chest 1 View    Result Date: 12/28/2023  Impression: No acute cardiopulmonary findings. Electronically Signed: Pb Metzger MD  12/28/2023 2:27 PM EST  Workstation ID: [id number]    XR Chest 1 View    Result Date: 12/26/2023  Impression: No active disease. Electronically Signed: Darwin Sood MD  12/26/2023 4:19 PM EST  Workstation ID: [id number]       Results for orders placed or performed during the hospital encounter of 12/28/23   Comprehensive Metabolic Panel    Specimen: Blood   Result Value Ref Range    Glucose 110 (H) 65 - 99 mg/dL    BUN 9 6 - 20 mg/dL    Creatinine 0.79 0.76 - 1.27 mg/dL    Sodium 139 136 - 145 mmol/L    Potassium 4.2 3.5 - 5.2 mmol/L    Chloride 100 98 - 107 mmol/L    CO2 27.0 22.0 - 29.0 mmol/L    Calcium 10.4 8.6 - 10.5 mg/dL    Total Protein 7.9 6.0 - 8.5 g/dL    Albumin 4.5 3.5 - 5.2 g/dL    ALT (SGPT) 92 (H) 1 - 41 U/L    AST (SGOT) 71 (H) 1 - 40 U/L    Alkaline Phosphatase 116 39 - 117 U/L    Total Bilirubin 0.8 0.0 - 1.2 mg/dL    Globulin 3.4 gm/dL    A/G Ratio 1.3 g/dL    BUN/Creatinine Ratio 11.4 7.0 - 25.0    Anion Gap 12.0 5.0 - 15.0  mmol/L    eGFR 108.2 >60.0 mL/min/1.73   Protime-INR    Specimen: Blood   Result Value Ref Range    Protime 11.0 9.6 - 11.7 Seconds    INR 1.01 0.93 - 1.10   aPTT    Specimen: Blood   Result Value Ref Range    PTT 28.8 (L) 61.0 - 76.5 seconds   Lipase    Specimen: Blood   Result Value Ref Range    Lipase 35 13 - 60 U/L   Urinalysis With Microscopic If Indicated (No Culture) - Urine, Clean Catch    Specimen: Urine, Clean Catch   Result Value Ref Range    Color, UA Yellow Yellow, Straw    Appearance, UA Clear Clear    pH, UA 7.0 5.0 - 8.0    Specific Gravity, UA 1.009 1.005 - 1.030    Glucose, UA Negative Negative    Ketones, UA Negative Negative    Bilirubin, UA Negative Negative    Blood, UA Negative Negative    Protein, UA Negative Negative    Leuk Esterase, UA Negative Negative    Nitrite, UA Negative Negative    Urobilinogen, UA 0.2 E.U./dL 0.2 - 1.0 E.U./dL   High Sensitivity Troponin T    Specimen: Blood   Result Value Ref Range    HS Troponin T <6 <22 ng/L   D-dimer, Quantitative    Specimen: Blood   Result Value Ref Range    D-Dimer, Quantitative 0.27 0.00 - 0.50 mg/L (FEU)   BNP    Specimen: Blood   Result Value Ref Range    proBNP <36.0 0.0 - 900.0 pg/mL   Magnesium    Specimen: Blood   Result Value Ref Range    Magnesium 1.9 1.6 - 2.6 mg/dL   TSH    Specimen: Blood   Result Value Ref Range    TSH 1.690 0.270 - 4.200 uIU/mL   CBC Auto Differential    Specimen: Blood   Result Value Ref Range    WBC 11.30 (H) 3.40 - 10.80 10*3/mm3    RBC 4.80 4.14 - 5.80 10*6/mm3    Hemoglobin 15.2 13.0 - 17.7 g/dL    Hematocrit 44.8 37.5 - 51.0 %    MCV 93.3 79.0 - 97.0 fL    MCH 31.8 26.6 - 33.0 pg    MCHC 34.0 31.5 - 35.7 g/dL    RDW 13.5 12.3 - 15.4 %    RDW-SD 46.4 37.0 - 54.0 fl    MPV 8.3 6.0 - 12.0 fL    Platelets 266 140 - 450 10*3/mm3    Neutrophil % 67.4 42.7 - 76.0 %    Lymphocyte % 22.7 19.6 - 45.3 %    Monocyte % 8.0 5.0 - 12.0 %    Eosinophil % 0.9 0.3 - 6.2 %    Basophil % 1.0 0.0 - 1.5 %    Neutrophils,  Absolute 7.60 (H) 1.70 - 7.00 10*3/mm3    Lymphocytes, Absolute 2.60 0.70 - 3.10 10*3/mm3    Monocytes, Absolute 0.90 0.10 - 0.90 10*3/mm3    Eosinophils, Absolute 0.10 0.00 - 0.40 10*3/mm3    Basophils, Absolute 0.10 0.00 - 0.20 10*3/mm3    nRBC 0.1 0.0 - 0.2 /100 WBC   ECG 12 Lead Chest Pain   Result Value Ref Range    QT Interval 310 ms    QTC Interval 408 ms   Lavender Top   Result Value Ref Range    Extra Tube hold for add-on    Gold Top - SST   Result Value Ref Range    Extra Tube Hold for add-ons.    Light Blue Top   Result Value Ref Range    Extra Tube Hold for add-ons.                Medical Decision Making  Patient was seen evaluated for the above problem    Differential diagnosis includes but is not limited to ACS, PE, medication reaction, brain tumor    [facility].  Persisting with the symptoms here in the emergency department but also with palpitations and exertional dyspnea.  He does have multiple risk factors for cardiac disease.  Will place in the ED observation for stress testing and further workup.  He has chronically elevated LFTs.  He was told he had fatty liver in the past.  He has never abused drugs.  He used to drink alcohol but not for many years.  Will obtain hepatitis panel and he is complaining of some right upper quadrant tenderness as well.  Gallbladder ultrasound ordered.  Will obtain MRI brain to make sure there is no olfactory or tumor causing those symptoms.  Spoke with Cheryl on-call for the ED observation unit who agrees to take the patient.  His D-dimer is normal.  He is not hypoxic.  I do not think he has pulmonary embolism.  Chest x-ray no pneumothorax.  No cardiac abnormality.    Amount and/or Complexity of Data Reviewed  Labs: ordered. Decision-making details documented in ED Course.     Details: Labs reviewed by myself  Radiology: ordered and independent interpretation performed. Decision-making  details documented in ED Course.     Details: Chest x-ray reviewed by myself.  No pneumothorax.  No acute cardiopulmonary process.  ECG/medicine tests: ordered and independent interpretation performed.     Details: EKG interpreted by myself sinus tachycardia poor wave progression rate of 104    Risk  Decision regarding hospitalization.        Final diagnoses:   None   Dyspnea  Palpitations  Olfactory abnormalities    ED Disposition  ED Disposition       None            No follow-up provider specified.       Medication List      No changes were made to your prescriptions during this visit.            Brent Valdivia,   12/28/23 1544

## 2023-12-28 NOTE — ED NOTES
Pt here c/o soa and smelling the smell of smoke.  Pt seen at [facility] 2 days ago and sts he had a negative exam there.

## 2023-12-28 NOTE — PLAN OF CARE
Goal Outcome Evaluation:   A/O x4 RA Adlib no C/O nausea/vomiting. RUQ tenderness. Mri completed to have gallbladder ultrasound

## 2023-12-28 NOTE — ED NOTES
Pt provided MRI screening form; pt A & Ox4 & currently filling out form   Was changing his socks yesterday at 5PM when he felt his lower back strain, at that time still able to walk, no numbness, tingling, weakness, incontinence. Took two advil at home and notes that this did not improve his symptoms significantly, went to sleep but then awoke w/ worsened pain. Notes that he has not been able to walk secondary to pain, states that he does not have numbness/tingling or weakness in his legs. Can worsen his pain by leaning forward or with movement of his hips in flexion. Has not noticed other stressors for his pain. He does not take any medications regularly and is otherwise healthy.

## 2023-12-28 NOTE — LETTER
December 29, 2023     Patient: Negrito Blanca   YOB: 1975   Date of Visit: 12/28/2023       To Whom It May Concern:    It is my medical opinion that Negrito Blanca may return to work on 01/2/2023 .           Sincerely,

## 2023-12-29 ENCOUNTER — APPOINTMENT (OUTPATIENT)
Dept: RESPIRATORY THERAPY | Facility: HOSPITAL | Age: 48
End: 2023-12-29
Payer: COMMERCIAL

## 2023-12-29 ENCOUNTER — APPOINTMENT (OUTPATIENT)
Dept: NUCLEAR MEDICINE | Facility: HOSPITAL | Age: 48
End: 2023-12-29
Payer: COMMERCIAL

## 2023-12-29 VITALS
DIASTOLIC BLOOD PRESSURE: 69 MMHG | SYSTOLIC BLOOD PRESSURE: 102 MMHG | OXYGEN SATURATION: 94 % | WEIGHT: 230 LBS | RESPIRATION RATE: 17 BRPM | BODY MASS INDEX: 32.93 KG/M2 | HEIGHT: 70 IN | HEART RATE: 102 BPM | TEMPERATURE: 98.6 F

## 2023-12-29 LAB
ANION GAP SERPL CALCULATED.3IONS-SCNC: 12 MMOL/L (ref 5–15)
BASOPHILS # BLD AUTO: 0.1 10*3/MM3 (ref 0–0.2)
BASOPHILS NFR BLD AUTO: 0.6 % (ref 0–1.5)
BH CV NUCLEAR PRIOR STUDY: 3
BH CV REST NUCLEAR ISOTOPE DOSE: 11 MCI
BH CV STRESS BP STAGE 1: NORMAL
BH CV STRESS BP STAGE 2: NORMAL
BH CV STRESS BP STAGE 3: NORMAL
BH CV STRESS COMMENTS STAGE 1: NORMAL
BH CV STRESS COMMENTS STAGE 2: NORMAL
BH CV STRESS DOSE REGADENOSON STAGE 1: 0.4
BH CV STRESS DURATION MIN STAGE 1: 0
BH CV STRESS DURATION MIN STAGE 2: 4
BH CV STRESS DURATION SEC STAGE 1: 10
BH CV STRESS DURATION SEC STAGE 2: 0
BH CV STRESS HR STAGE 1: 84
BH CV STRESS HR STAGE 2: 121
BH CV STRESS HR STAGE 3: 124
BH CV STRESS NUCLEAR ISOTOPE DOSE: 31 MCI
BH CV STRESS PROTOCOL 1: NORMAL
BH CV STRESS RECOVERY BP: NORMAL MMHG
BH CV STRESS RECOVERY HR: 115 BPM
BH CV STRESS STAGE 1: 1
BH CV STRESS STAGE 2: 2
BH CV STRESS STAGE 3: 3
BUN SERPL-MCNC: 11 MG/DL (ref 6–20)
BUN/CREAT SERPL: 13.3 (ref 7–25)
CALCIUM SPEC-SCNC: 9.4 MG/DL (ref 8.6–10.5)
CHLORIDE SERPL-SCNC: 99 MMOL/L (ref 98–107)
CHOLEST SERPL-MCNC: 144 MG/DL (ref 0–200)
CO2 SERPL-SCNC: 28 MMOL/L (ref 22–29)
CREAT SERPL-MCNC: 0.83 MG/DL (ref 0.76–1.27)
DEPRECATED RDW RBC AUTO: 45.1 FL (ref 37–54)
EGFRCR SERPLBLD CKD-EPI 2021: 106.6 ML/MIN/1.73
EOSINOPHIL # BLD AUTO: 0.2 10*3/MM3 (ref 0–0.4)
EOSINOPHIL NFR BLD AUTO: 1.7 % (ref 0.3–6.2)
ERYTHROCYTE [DISTWIDTH] IN BLOOD BY AUTOMATED COUNT: 13.7 % (ref 12.3–15.4)
GLUCOSE BLDC GLUCOMTR-MCNC: 156 MG/DL (ref 70–105)
GLUCOSE SERPL-MCNC: 122 MG/DL (ref 65–99)
HCT VFR BLD AUTO: 42.3 % (ref 37.5–51)
HDLC SERPL-MCNC: 33 MG/DL (ref 40–60)
HGB BLD-MCNC: 14.6 G/DL (ref 13–17.7)
LDLC SERPL CALC-MCNC: 88 MG/DL (ref 0–100)
LDLC/HDLC SERPL: 2.59 {RATIO}
LV EF NUC BP: 70 %
LYMPHOCYTES # BLD AUTO: 2.9 10*3/MM3 (ref 0.7–3.1)
LYMPHOCYTES NFR BLD AUTO: 27.3 % (ref 19.6–45.3)
MAXIMAL PREDICTED HEART RATE: 170 BPM
MCH RBC QN AUTO: 32.5 PG (ref 26.6–33)
MCHC RBC AUTO-ENTMCNC: 34.5 G/DL (ref 31.5–35.7)
MCV RBC AUTO: 94.4 FL (ref 79–97)
MONOCYTES # BLD AUTO: 1.1 10*3/MM3 (ref 0.1–0.9)
MONOCYTES NFR BLD AUTO: 10.1 % (ref 5–12)
NEUTROPHILS NFR BLD AUTO: 6.5 10*3/MM3 (ref 1.7–7)
NEUTROPHILS NFR BLD AUTO: 60.3 % (ref 42.7–76)
NRBC BLD AUTO-RTO: 0 /100 WBC (ref 0–0.2)
PERCENT MAX PREDICTED HR: 72.94 %
PLATELET # BLD AUTO: 254 10*3/MM3 (ref 140–450)
PMV BLD AUTO: 8.4 FL (ref 6–12)
POTASSIUM SERPL-SCNC: 3.9 MMOL/L (ref 3.5–5.2)
QT INTERVAL: 310 MS
QTC INTERVAL: 408 MS
RBC # BLD AUTO: 4.48 10*6/MM3 (ref 4.14–5.8)
SODIUM SERPL-SCNC: 139 MMOL/L (ref 136–145)
STRESS BASELINE BP: NORMAL MMHG
STRESS BASELINE HR: 90 BPM
STRESS PERCENT HR: 86 %
STRESS POST PEAK BP: NORMAL MMHG
STRESS POST PEAK HR: 124 BPM
STRESS TARGET HR: 145 BPM
TRIGL SERPL-MCNC: 128 MG/DL (ref 0–150)
VLDLC SERPL-MCNC: 23 MG/DL (ref 5–40)
WBC NRBC COR # BLD AUTO: 10.7 10*3/MM3 (ref 3.4–10.8)

## 2023-12-29 PROCEDURE — A9502 TC99M TETROFOSMIN: HCPCS | Performed by: EMERGENCY MEDICINE

## 2023-12-29 PROCEDURE — G0378 HOSPITAL OBSERVATION PER HR: HCPCS

## 2023-12-29 PROCEDURE — 93018 CV STRESS TEST I&R ONLY: CPT | Performed by: INTERNAL MEDICINE

## 2023-12-29 PROCEDURE — 0 TECHNETIUM TETROFOSMIN KIT: Performed by: EMERGENCY MEDICINE

## 2023-12-29 PROCEDURE — 25010000002 REGADENOSON 0.4 MG/5ML SOLUTION: Performed by: EMERGENCY MEDICINE

## 2023-12-29 PROCEDURE — 85025 COMPLETE CBC W/AUTO DIFF WBC: CPT | Performed by: PHYSICIAN ASSISTANT

## 2023-12-29 PROCEDURE — 93017 CV STRESS TEST TRACING ONLY: CPT

## 2023-12-29 PROCEDURE — 80061 LIPID PANEL: CPT | Performed by: PHYSICIAN ASSISTANT

## 2023-12-29 PROCEDURE — 80048 BASIC METABOLIC PNL TOTAL CA: CPT | Performed by: PHYSICIAN ASSISTANT

## 2023-12-29 PROCEDURE — 78452 HT MUSCLE IMAGE SPECT MULT: CPT | Performed by: INTERNAL MEDICINE

## 2023-12-29 PROCEDURE — 78452 HT MUSCLE IMAGE SPECT MULT: CPT

## 2023-12-29 PROCEDURE — 63710000001 INSULIN LISPRO (HUMAN) PER 5 UNITS: Performed by: PHYSICIAN ASSISTANT

## 2023-12-29 PROCEDURE — 82948 REAGENT STRIP/BLOOD GLUCOSE: CPT

## 2023-12-29 RX ORDER — REGADENOSON 0.08 MG/ML
0.4 INJECTION, SOLUTION INTRAVENOUS
Status: COMPLETED | OUTPATIENT
Start: 2023-12-29 | End: 2023-12-29

## 2023-12-29 RX ADMIN — Medication 10 ML: at 09:00

## 2023-12-29 RX ADMIN — INSULIN LISPRO 2 UNITS: 100 INJECTION, SOLUTION INTRAVENOUS; SUBCUTANEOUS at 09:38

## 2023-12-29 RX ADMIN — LISINOPRIL 5 MG: 5 TABLET ORAL at 08:59

## 2023-12-29 RX ADMIN — REGADENOSON 0.4 MG: 0.08 INJECTION, SOLUTION INTRAVENOUS at 07:59

## 2023-12-29 RX ADMIN — PREGABALIN 225 MG: 75 CAPSULE ORAL at 08:59

## 2023-12-29 RX ADMIN — TETROFOSMIN 1 DOSE: 1.38 INJECTION, POWDER, LYOPHILIZED, FOR SOLUTION INTRAVENOUS at 06:53

## 2023-12-29 RX ADMIN — TETROFOSMIN 1 DOSE: 1.38 INJECTION, POWDER, LYOPHILIZED, FOR SOLUTION INTRAVENOUS at 07:59

## 2023-12-29 NOTE — PLAN OF CARE
Goal Outcome Evaluation:      A/O x4 RA with SOA at times. Cont pulse ox WNL. No C/O pains/nausea/vomiting. Going for stress test this am.

## 2023-12-29 NOTE — NURSING NOTE
Educated patient on discharge instructions, pt verbalized understanding and had no further questions/concerns at this time. IV and heart monitor removed. Patient awaiting [facility] placement

## 2023-12-29 NOTE — DISCHARGE SUMMARY
"[facility]    Sarah Cannon, [place]    CHIEF COMPLAINT:     Phantosmia, soa with palpitations    HISTORY OF PRESENT ILLNESS:    Shortness of Breath  Associated symptoms include abdominal pain. Pertinent negatives include no chest pain or vomiting.     Patient is a 48-year-old who presents with 10 days worth of increasing episodes of shortness of breath with palpitations..  States that they come and go randomly.  He has not had chest pain.  He has had loss of appetite and some fatigue.  He states that the symptoms seem to start after being on Wellbutrin.  He has stopped that medication for the last 3 days and is still having the symptoms he presented here for evaluation.  He was seen at the freestanding emergency room and LFTs were elevated and he has had some RUQ discomfort.  Hx of hepatic steatosis. He has a history of htn and hpl but recently just started taking his hyperlipidemia medication.  He is a former smoker.  He also states that for a week he has had a smell of \"smoke\" in his nostrils.  Has been persistent every time he breathes in.     Observation 23  Pt concurs with er hpi. Mri and us gall bladder performed. Stress test ordered.     Past Medical History:   Diagnosis Date    Difficulty walking     Foot fracture ankle sprain    Hyperlipidemia     Hypertension     Neuropathy in diabetes     Stress fracture      Past Surgical History:   Procedure Laterality Date    BACK SURGERY      WISDOM TOOTH EXTRACTION       History reviewed. No pertinent family history.  Social History     Tobacco Use    Smoking status: Former     Types: Cigarettes     Quit date: 3/16/2016     Years since quittin.7    Smokeless tobacco: Current     Types: Snuff   Vaping Use    Vaping Use: Never used   Substance Use Topics    Alcohol use: Not Currently    Drug use: Never     Medications Prior to Admission   Medication Sig Dispense Refill Last Dose    atorvastatin (LIPITOR) 40 MG tablet " Take 1 tablet by mouth Every Evening.   12/27/2023    lisinopril (PRINIVIL,ZESTRIL) 5 MG tablet Take 1 tablet by mouth Daily.   12/28/2023    metFORMIN (GLUCOPHAGE) 850 MG tablet Take 1 tablet by mouth 2 (Two) Times a Day.       pregabalin (LYRICA) 225 MG capsule Take 1 capsule by mouth 2 (Two) Times a Day.   12/28/2023     Allergies:  Patient has no known allergies.    Immunization History   Administered Date(s) Administered    COVID-19 (PFIZER) Purple Cap Monovalent 03/19/2021, 04/09/2021           REVIEW OF SYSTEMS:    Review of Systems   Constitutional: Positive for malaise/fatigue.   HENT:  Negative for congestion, hoarse voice and nosebleeds.         Smells smoke constantly   Cardiovascular:  Positive for palpitations. Negative for chest pain.   Respiratory:  Positive for shortness of breath.    Gastrointestinal:  Positive for abdominal pain. Negative for nausea and vomiting.         Vital Signs  Temp:  [97.3 °F (36.3 °C)-99.1 °F (37.3 °C)] 97.7 °F (36.5 °C)  Heart Rate:  [] 74  Resp:  [14-20] 17  BP: (102-146)/() 114/80          Physical Exam:  Physical Exam  Constitutional:       Appearance: Normal appearance.   HENT:      Nose: No congestion or rhinorrhea.      Mouth/Throat:      Mouth: Mucous membranes are moist.   Eyes:      Extraocular Movements: Extraocular movements intact.      Pupils: Pupils are equal, round, and reactive to light.   Cardiovascular:      Rate and Rhythm: Normal rate and regular rhythm.      Pulses: Normal pulses.      Heart sounds: Normal heart sounds.   Pulmonary:      Effort: Pulmonary effort is normal.      Breath sounds: Normal breath sounds.   Abdominal:      Palpations: Abdomen is soft.   Skin:     General: Skin is warm and dry.   Neurological:      General: No focal deficit present.      Mental Status: He is alert and oriented to person, place, and time.   Psychiatric:         Mood and Affect: Mood normal.         Behavior: Behavior normal.       Emotional  Behavior:    wnl   Debilities:   None      Results Review:    I reviewed the patient's new clinical results.  Lab Results (most recent)       Procedure Component Value Units Date/Time    Basic Metabolic Panel [[id number]]  (Abnormal) Collected: 12/29/23 0516    Specimen: Blood from Arm, Left Updated: 12/29/23 0613     Glucose 122 mg/dL      BUN 11 mg/dL      Creatinine 0.83 mg/dL      Sodium 139 mmol/L      Potassium 3.9 mmol/L      Chloride 99 mmol/L      CO2 28.0 mmol/L      Calcium 9.4 mg/dL      BUN/Creatinine Ratio 13.3     Anion Gap 12.0 mmol/L      eGFR 106.6 mL/min/1.73     Narrative:      GFR Normal >60  Chronic Kidney Disease <60  Kidney Failure <15      CBC & Differential [[id number]]  (Abnormal) Collected: 12/29/23 0516    Specimen: Blood from Arm, Left Updated: 12/29/23 0546    Narrative:      The following orders were created for panel order CBC & Differential.  Procedure                               Abnormality         Status                     ---------                               -----------         ------                     CBC Auto Differential[[id number]]        Abnormal            Final result                 Please view results for these tests on the individual orders.    CBC Auto Differential [[id number]]  (Abnormal) Collected: 12/29/23 0516    Specimen: Blood from Arm, Left Updated: 12/29/23 0546     WBC 10.70 10*3/mm3      RBC 4.48 10*6/mm3      Hemoglobin 14.6 g/dL      Hematocrit 42.3 %      MCV 94.4 fL      MCH 32.5 pg      MCHC 34.5 g/dL      RDW 13.7 %      RDW-SD 45.1 fl      MPV 8.4 fL      Platelets 254 10*3/mm3      Neutrophil % 60.3 %      Lymphocyte % 27.3 %      Monocyte % 10.1 %      Eosinophil % 1.7 %      Basophil % 0.6 %      Neutrophils, Absolute 6.50 10*3/mm3      Lymphocytes, Absolute 2.90 10*3/mm3      Monocytes, Absolute 1.10 10*3/mm3      Eosinophils, Absolute 0.20 10*3/mm3      Basophils, Absolute 0.10 10*3/mm3      nRBC 0.0 /100 WBC     POC Glucose Once [[id number]]   (Abnormal) Collected: 12/28/23 2129    Specimen: Blood Updated: 12/28/23 2131     Glucose 157 mg/dL      Comment: Serial Number: [id number]:  [id number]       High Sensitivity Troponin T 2Hr [[id number]] Collected: 12/28/23 1753    Specimen: Blood from Arm, Left Updated: 12/28/23 1830     HS Troponin T 8 ng/L      Troponin T Delta --     Comment: Unable to calculate.       Narrative:      High Sensitive Troponin T Reference Range:  <14.0 ng/L- Negative Female for AMI  <22.0 ng/L- Negative Male for AMI  >=14 - Abnormal Female indicating possible myocardial injury.  >=22 - Abnormal Male indicating possible myocardial injury.   Clinicians would have to utilize clinical acumen, EKG, Troponin, and serial changes to determine if it is an Acute Myocardial Infarction or myocardial injury due to an underlying chronic condition.         POC Glucose Once [[id number]]  (Normal) Collected: 12/28/23 1735    Specimen: Blood Updated: 12/28/23 1736     Glucose 92 mg/dL      Comment: Serial Number: [id number]:  [id number]       Hepatitis Panel, Acute [[id number]]  (Normal) Collected: 12/28/23 1340    Specimen: Blood Updated: 12/28/23 1635     Hepatitis B Surface Ag Non-Reactive     Hep A IgM Non-Reactive     Hep B C IgM Non-Reactive     Hepatitis C Ab Non-Reactive    Narrative:      Results may be falsely decreased if patient taking Biotin.     Urinalysis With Microscopic If Indicated (No Culture) - Urine, Clean Catch [[id number]]  (Normal) Collected: 12/28/23 1445    Specimen: Urine, Clean Catch Updated: 12/28/23 1457     Color, UA Yellow     Appearance, UA Clear     pH, UA 7.0     Specific Gravity, UA 1.009     Glucose, UA Negative     Ketones, UA Negative     Bilirubin, UA Negative     Blood, UA Negative     Protein, UA Negative     Leuk Esterase, UA Negative     Nitrite, UA Negative     Urobilinogen, UA 0.2 E.U./dL    Narrative:      Urine microscopic not indicated.    [place] Draw [[id number]] Collected: 12/28/23 1340     Specimen: Blood Updated: 12/28/23 1445    Narrative:      The following orders were created for panel order [place] [facility] Draw.  Procedure                               Abnormality         Status                     ---------                               -----------         ------                     Green Top (Gel)[[id number]]                                                             Lavender Top[[id number]]                                     Final result               Gold Top - SST[[id number]]                                   Final result               Light Blue Top[[id number]]                                   Final result                 Please view results for these tests on the individual orders.    Lavender Top [[id number]] Collected: 12/28/23 1340    Specimen: Blood Updated: 12/28/23 1445     Extra Tube hold for add-on     Comment: Auto resulted       Gold Top - SST [[id number]] Collected: 12/28/23 1340    Specimen: Blood Updated: 12/28/23 1445     Extra Tube Hold for add-ons.     Comment: Auto resulted.       Light Blue Top [[id number]] Collected: 12/28/23 1340    Specimen: Blood Updated: 12/28/23 1445     Extra Tube Hold for add-ons.     Comment: Auto resulted       Comprehensive Metabolic Panel [[id number]]  (Abnormal) Collected: 12/28/23 1340    Specimen: Blood Updated: 12/28/23 1434     Glucose 110 mg/dL      BUN 9 mg/dL      Creatinine 0.79 mg/dL      Sodium 139 mmol/L      Potassium 4.2 mmol/L      Chloride 100 mmol/L      CO2 27.0 mmol/L      Calcium 10.4 mg/dL      Total Protein 7.9 g/dL      Albumin 4.5 g/dL      ALT (SGPT) 92 U/L      AST (SGOT) 71 U/L      Alkaline Phosphatase 116 U/L      Total Bilirubin 0.8 mg/dL      Globulin 3.4 gm/dL      A/G Ratio 1.3 g/dL      BUN/Creatinine Ratio 11.4     Anion Gap 12.0 mmol/L      eGFR 108.2 mL/min/1.73     Narrative:      GFR Normal >60  Chronic Kidney Disease <60  Kidney Failure <15      Lipase [[id number]]  (Normal) Collected: 12/28/23 1340    Specimen: Blood  Updated: 12/28/23 1434     Lipase 35 U/L     High Sensitivity Troponin T [[id number]]  (Normal) Collected: 12/28/23 1340    Specimen: Blood Updated: 12/28/23 1434     HS Troponin T <6 ng/L     Narrative:      High Sensitive Troponin T Reference Range:  <14.0 ng/L- Negative Female for AMI  <22.0 ng/L- Negative Male for AMI  >=14 - Abnormal Female indicating possible myocardial injury.  >=22 - Abnormal Male indicating possible myocardial injury.   Clinicians would have to utilize clinical acumen, EKG, Troponin, and serial changes to determine if it is an Acute Myocardial Infarction or myocardial injury due to an underlying chronic condition.         BNP [[id number]]  (Normal) Collected: 12/28/23 1340    Specimen: Blood Updated: 12/28/23 1434     proBNP <36.0 pg/mL     Narrative:      This assay is used as an aid in the diagnosis of individuals suspected of having heart failure. It can be used as an aid in the diagnosis of acute decompensated heart failure (ADHF) in patients presenting with signs and symptoms of ADHF to the emergency department (ED). In addition, NT-proBNP of <300 pg/mL indicates ADHF is not likely.    Age Range Result Interpretation  NT-proBNP Concentration (pg/mL:      <50             Positive            >450                   Gray                 300-450                    Negative             <300    50-75           Positive            >900                  Gray                300-900                  Negative            <300      >75             Positive            >1800                  Gray                300-1800                  Negative            <300    Magnesium [[id number]]  (Normal) Collected: 12/28/23 1340    Specimen: Blood Updated: 12/28/23 1434     Magnesium 1.9 mg/dL     TSH [[id number]]  (Normal) Collected: 12/28/23 1340    Specimen: Blood Updated: 12/28/23 1434     TSH 1.690 uIU/mL     Protime-INR [[id number]]  (Normal) Collected: 12/28/23 1340    Specimen: Blood Updated: 12/28/23 1406  "    Protime 11.0 Seconds      INR 1.01    aPTT [[id number]]  (Abnormal) Collected: 12/28/23 1340    Specimen: Blood Updated: 12/28/23 1408     PTT 28.8 seconds     D-dimer, Quantitative [[id number]]  (Normal) Collected: 12/28/23 1340    Specimen: Blood Updated: 12/28/23 1408     D-Dimer, Quantitative 0.27 mg/L (FEU)     Narrative:      According to the assay 's published package insert, a normal (<0.50 mg/L (FEU)) D-dimer result in conjunction with a non-high clinical probability assessment, excludes deep vein thrombosis (DVT) and pulmonary embolism (PE) with high sensitivity.    D-dimer values increase with age and this can make VTE exclusion of an older population difficult. To address this, the [employer], based on best available evidence and recent guidelines, recommends that clinicians use age-adjusted D-dimer thresholds in patients greater than 50 years of age with: a) a low probability of PE who do not meet all Pulmonary Embolism Rule Out Criteria, or b) in those with intermediate probability of PE.   The formula for an age-adjusted D-dimer cut-off is \"age/100\".  For example, a 60 year old patient would have an age-adjusted cut-off of 0.60 mg/L (FEU) and an 80 year old 0.80 mg/L (FEU).    CBC & Differential [[id number]]  (Abnormal) Collected: 12/28/23 1340    Specimen: Blood Updated: 12/28/23 1358    Narrative:      The following orders were created for panel order CBC & Differential.  Procedure                               Abnormality         Status                     ---------                               -----------         ------                     CBC Auto Differential[[id number]]        Abnormal            Final result                 Please view results for these tests on the individual orders.    CBC Auto Differential [[id number]]  (Abnormal) Collected: 12/28/23 1340    Specimen: Blood Updated: 12/28/23 1358     WBC 11.30 10*3/mm3      RBC 4.80 10*6/mm3      Hemoglobin " 15.2 g/dL      Hematocrit 44.8 %      MCV 93.3 fL      MCH 31.8 pg      MCHC 34.0 g/dL      RDW 13.5 %      RDW-SD 46.4 fl      MPV 8.3 fL      Platelets 266 10*3/mm3      Neutrophil % 67.4 %      Lymphocyte % 22.7 %      Monocyte % 8.0 %      Eosinophil % 0.9 %      Basophil % 1.0 %      Neutrophils, Absolute 7.60 10*3/mm3      Lymphocytes, Absolute 2.60 10*3/mm3      Monocytes, Absolute 0.90 10*3/mm3      Eosinophils, Absolute 0.10 10*3/mm3      Basophils, Absolute 0.10 10*3/mm3      nRBC 0.1 /100 WBC             Imaging Results (Most Recent)       Procedure Component Value Units Date/Time    US Abdomen Limited [[id number]] Collected: 12/28/23 1847     Updated: 12/28/23 1853    Narrative:      US ABDOMEN LIMITED    Date of Exam: 12/28/2023 6:39 PM EST    Indication: ruq pain.    Comparison: No comparisons available.    Technique: Grayscale and color Doppler ultrasound evaluation of the right upper quadrant was performed.      Findings:  Visualized portions of the pancreatic parenchyma demonstrate normal echogenicity. The background liver echogenicity is increased most compatible hepatic steatosis. Hepatopetal flow in the portal vein. Visualized hepatic veins are patent. Gallbladder   present. Negative for cholelithiasis. No gallbladder wall thickening. Normal caliber common bile duct measuring 5 mm. The right kidney measures 11.3 x 6.1 cm. No right-sided hydronephrosis.      Impression:      Impression:  1. Hepatic steatosis.  2. Normal gallbladder.  3. No biliary dilatation.        Electronically Signed: Adrian Velasquez MD    12/28/2023 6:51 PM EST    Workstation ID: [id number]    MRI Brain With & Without Contrast [[id number]] Collected: 12/28/23 1723     Updated: 12/28/23 1729    Narrative:      MRI BRAIN W WO CONTRAST    Date of Exam: 12/28/2023 3:41 PM CST    Indication: olfactory abnormalities.     Comparison: None available.    Technique:  Routine multiplanar/multisequence sequence images of the brain were obtained  before and after the uneventful administration of 20 cc Prohance.      Findings:  There is no diffusion restriction to suggest acute infarct.     There is no evidence of acute or chronic intracranial hemorrhage. No mass effect or midline shift. No abnormal extra-axial collections.     Minimal nonspecific subcortical white matter signal abnormality without mass effect nor enhancement. The basal ganglia, brainstem and cerebellum appear within normal limits. Midline structures are intact. No significant cortical abnormality is   identified.    Calvarial and superficial soft tissue signal is within normal limits. Orbits appear unremarkable. The paranasal sinuses and the mastoid air cells appear well aerated.     There is no abnormal intracranial enhancement. There is normal enhancement within the intracranial vasculature including the dural venous sinuses.      Impression:      Impression:  1.No acute intracranial process identified.  2.Trace nonspecific white matter changes most frequently seen in the setting of chronic microvascular ischemic disease.        Electronically Signed: Nathan Lee MD    12/28/2023 4:27 PM CST    Workstation ID: [id number]  Prohance    XR Chest 1 View [[id number]] Collected: 12/28/23 1427     Updated: 12/28/23 1430    Narrative:      XR CHEST 1 VW    Date of Exam: 12/28/2023 2:04 PM EST    Indication: soa    Comparison: 12/26/2023    Findings:  No focal pulmonary consolidations are evident. Pulmonary vascularity appears within normal limits. Heart size and mediastinal contour appear within normal limits. No pneumothorax or pleural fluid identified.      Impression:      Impression:  No acute cardiopulmonary findings.      Electronically Signed: Pb Metzger MD    12/28/2023 2:27 PM EST    Workstation ID: [id number]          reviewed    ECG/EMG Results (most recent)       Procedure Component Value Units Date/Time    SCANNED - TELEMETRY   [[id number]] Resulted: 12/28/23     Updated: 12/28/23 8610     SCANNED - TELEMETRY   [[id number]] Resulted: 12/28/23     Updated: 12/28/23 2320    SCANNED - TELEMETRY   [[id number]] Resulted: 12/28/23     Updated: 12/28/23 2346    SCANNED - TELEMETRY   [[id number]] Resulted: 12/28/23     Updated: 12/29/23 0549    ECG 12 Lead Chest Pain [[id number]] Collected: 12/28/23 1253     Updated: 12/29/23 0744     QT Interval 310 ms      QTC Interval 408 ms     Narrative:      HEART RATE= 104  bpm  RR Interval= 576  ms  FL Interval= 169  ms  P Horizontal Axis= -2  deg  P Front Axis= 39  deg  QRSD Interval= 86  ms  QT Interval= 310  ms  QTcB= 408  ms  QRS Axis= 17  deg  T Wave Axis= 56  deg  - ABNORMAL ECG -  Sinus tachycardia  Abnrm R prog, consider ASMI or lead placement  No previous ECG available for comparison  Electronically Signed By: Brent Valdivia) 29-Dec-2023 07:44:09  Date and Time of Study: 2023-12-28 12:53:10          reviewed            Microbiology Results (last 10 days)       ** No results found for the last 240 hours. **            Assessment & Plan     Dyspnea    Palpitations    Right upper quadrant abdominal pain    Elevated liver enzymes     Soa, palpitations  Lab Results   Component Value Date    TROPONINT 8 12/28/2023    TROPONINT <6 12/28/2023     -cbc is unremarkable  -Lipid panel unremarkable  - ddimer , tsh, bnp unremarkable  -Chest X-ray:showing no acute cardiopulmonary processes  -EKG:rate 104 sinus  -Stress Test performed and is low risk for ischemia  - mcot and echo as outpt  - follow up with pcp  -Telemetry        Phantosmia  - pt smells smoke  - seen at urgent care this week for same complaint  - instructed to follow up with ent  - mri brain showing no acute intracranial process      RUQ pain  - lipase normal  - hepatitis panel negative  - ua negative for uti or blood  - us gall bladder shows normal gallbladder, hepatic steatosis. No biliary dilatation.       Hypertension  -well Controlled   BP Readings from Last 1 Encounters:   12/29/23 114/80     -  Continue lisinopril  - Monitor while admitted     HPL  - cont statin      I discussed the patients findings and my recommendations with patient and nursing staff.     Discharge Diagnosis:      Dyspnea    Palpitations    Right upper quadrant abdominal pain    Elevated liver enzymes      Hospital Course  Patient is a 48 y.o. male presented with phantosmia and chest pain. Er evaluated pt and admitted to observation. Labs including cbc, ddimer, tsh, lipid panel, and bnp are normal. Lfts are slightly elevated but this is chronic. EKG sinus rate of 104. Mri brain performed and is normal. Pt c/o ruq pain and lipase is normal. Us ruq showed hepatic steatosis. Stress test performed and is low risk for ischemia. Pt has sinus on monitor here. Mcot and echo ordered for outpt and pt will follow up with pcp. Discharge discussed with pt and he is agreeable to plan. Instructed pt to return to er if symptoms reoccur or worsen.  .     Past Medical History:     Past Medical History:   Diagnosis Date    Difficulty walking     Foot fracture ankle sprain    Hyperlipidemia     Hypertension     Neuropathy in diabetes     Stress fracture        Past Surgical History:     Past Surgical History:   Procedure Laterality Date    BACK SURGERY      WISDOM TOOTH EXTRACTION         Social History:   Social History     Socioeconomic History    Marital status: Single   Tobacco Use    Smoking status: Former     Types: Cigarettes     Quit date: 3/16/2016     Years since quittin.7    Smokeless tobacco: Current     Types: Snuff   Vaping Use    Vaping Use: Never used   Substance and Sexual Activity    Alcohol use: Not Currently    Drug use: Never    Sexual activity: Defer       Procedures Performed         Consults:   Consults       No orders found for last 30 day(s).            Condition on Discharge:     Stable    Discharge Disposition      Discharge Medications     Discharge Medications        Continue These Medications        Instructions  Start Date   atorvastatin 40 MG tablet  Commonly known as: LIPITOR   40 mg, Oral, Every Evening      lisinopril 5 MG tablet  Commonly known as: PRINIVIL,ZESTRIL   5 mg, Oral, Daily      metFORMIN 850 MG tablet  Commonly known as: GLUCOPHAGE   Take 1 tablet by mouth 2 (Two) Times a Day.      pregabalin 225 MG capsule  Commonly known as: LYRICA   Take 1 capsule by mouth 2 (Two) Times a Day.               Discharge Diet:     Activity at Discharge:     Follow-up Appointments  Future Appointments   Date Time Provider Department Center   12/29/2023  8:35 AM [facility]  [facility]   12/29/2023 10:05 AM [facility] 1 [facility]         Test Results Pending at Discharge       Risk for Readmission (LACE) Score: 1 (12/29/2023  6:00 AM)      Less Than 30 minutes spent in discharge activities for this patient    Ela Abrams PA-C  12/29/23  08:04 EST

## 2023-12-29 NOTE — PLAN OF CARE
Problem: Adult Inpatient Plan of Care  Goal: Plan of Care Review  Outcome: Ongoing, Progressing  Flowsheets (Taken 12/29/2023 0119)  Outcome Evaluation: pt npo for stress myoview  Goal: Patient-Specific Goal (Individualized)  Outcome: Ongoing, Progressing  Goal: Absence of Hospital-Acquired Illness or Injury  Outcome: Ongoing, Progressing  Intervention: Identify and Manage Fall Risk  Recent Flowsheet Documentation  Taken 12/29/2023 0000 by Margo Triana RN  Safety Promotion/Fall Prevention:   safety round/check completed   nonskid shoes/slippers when out of bed  Taken 12/28/2023 2200 by Margo Triana RN  Safety Promotion/Fall Prevention: safety round/check completed  Taken 12/28/2023 2000 by Margo Triana RN  Safety Promotion/Fall Prevention:   safety round/check completed   nonskid shoes/slippers when out of bed  Intervention: Prevent Skin Injury  Recent Flowsheet Documentation  Taken 12/29/2023 0000 by Margo Triana RN  Body Position:   position changed independently   side-lying  Taken 12/28/2023 2000 by Margo Triana RN  Body Position:   position changed independently   sitting up in bed  Goal: Optimal Comfort and Wellbeing  Outcome: Ongoing, Progressing  Intervention: Provide Person-Centered Care  Recent Flowsheet Documentation  Taken 12/28/2023 2000 by Margo Triana RN  Trust Relationship/Rapport:   care explained   choices provided   emotional support provided   empathic listening provided   questions answered   questions encouraged   reassurance provided   thoughts/feelings acknowledged  Goal: Readiness for Transition of Care  Outcome: Ongoing, Progressing   Goal Outcome Evaluation:              Outcome Evaluation: pt npo for stress myoview

## 2024-05-23 ENCOUNTER — OFFICE (AMBULATORY)
Dept: URBAN - METROPOLITAN AREA CLINIC 64 | Facility: CLINIC | Age: 49
End: 2024-05-23

## 2024-05-23 VITALS
HEIGHT: 70 IN | DIASTOLIC BLOOD PRESSURE: 75 MMHG | WEIGHT: 225 LBS | SYSTOLIC BLOOD PRESSURE: 120 MMHG | HEART RATE: 66 BPM

## 2024-05-23 DIAGNOSIS — E11.9 TYPE 2 DIABETES MELLITUS WITHOUT COMPLICATIONS: ICD-10-CM

## 2024-05-23 DIAGNOSIS — R74.8 ABNORMAL LEVELS OF OTHER SERUM ENZYMES: ICD-10-CM

## 2024-05-23 DIAGNOSIS — R10.11 RIGHT UPPER QUADRANT PAIN: ICD-10-CM

## 2024-05-23 PROCEDURE — 99204 OFFICE O/P NEW MOD 45 MIN: CPT

## 2024-05-24 ENCOUNTER — TRANSCRIBE ORDERS (OUTPATIENT)
Dept: ADMINISTRATIVE | Facility: HOSPITAL | Age: 49
End: 2024-05-24
Payer: COMMERCIAL

## 2024-05-24 DIAGNOSIS — R74.8 ELEVATED LIVER ENZYMES: Primary | ICD-10-CM

## 2024-05-24 DIAGNOSIS — R10.11 ABDOMINAL PAIN, RIGHT UPPER QUADRANT: ICD-10-CM

## 2024-05-24 DIAGNOSIS — E11.9 DIABETES MELLITUS WITHOUT COMPLICATION: ICD-10-CM

## 2024-06-20 ENCOUNTER — HOSPITAL ENCOUNTER (OUTPATIENT)
Dept: ULTRASOUND IMAGING | Facility: HOSPITAL | Age: 49
Discharge: HOME | End: 2024-06-20
Payer: COMMERCIAL

## 2024-06-20 DIAGNOSIS — R10.11 ABDOMINAL PAIN, RIGHT UPPER QUADRANT: ICD-10-CM

## 2024-06-20 DIAGNOSIS — E11.9 DIABETES MELLITUS WITHOUT COMPLICATION: ICD-10-CM

## 2024-06-20 DIAGNOSIS — R74.8 ELEVATED LIVER ENZYMES: ICD-10-CM

## 2024-06-20 PROCEDURE — 76705 ECHO EXAM OF ABDOMEN: CPT

## 2024-07-30 ENCOUNTER — ON CAMPUS - OUTPATIENT (AMBULATORY)
Dept: URBAN - METROPOLITAN AREA HOSPITAL 2 | Facility: HOSPITAL | Age: 49
End: 2024-07-30
Payer: COMMERCIAL

## 2024-07-30 ENCOUNTER — OFFICE (AMBULATORY)
Dept: URBAN - METROPOLITAN AREA PATHOLOGY 19 | Facility: PATHOLOGY | Age: 49
End: 2024-07-30
Payer: COMMERCIAL

## 2024-07-30 VITALS
SYSTOLIC BLOOD PRESSURE: 101 MMHG | RESPIRATION RATE: 17 BRPM | OXYGEN SATURATION: 97 % | SYSTOLIC BLOOD PRESSURE: 113 MMHG | HEART RATE: 74 BPM | DIASTOLIC BLOOD PRESSURE: 68 MMHG | HEART RATE: 94 BPM | DIASTOLIC BLOOD PRESSURE: 84 MMHG | SYSTOLIC BLOOD PRESSURE: 119 MMHG | DIASTOLIC BLOOD PRESSURE: 89 MMHG | SYSTOLIC BLOOD PRESSURE: 106 MMHG | SYSTOLIC BLOOD PRESSURE: 95 MMHG | OXYGEN SATURATION: 99 % | HEART RATE: 79 BPM | DIASTOLIC BLOOD PRESSURE: 64 MMHG | TEMPERATURE: 98 F | HEART RATE: 85 BPM | HEART RATE: 88 BPM | DIASTOLIC BLOOD PRESSURE: 92 MMHG | RESPIRATION RATE: 15 BRPM | HEART RATE: 87 BPM | RESPIRATION RATE: 13 BRPM | RESPIRATION RATE: 14 BRPM | SYSTOLIC BLOOD PRESSURE: 93 MMHG | RESPIRATION RATE: 12 BRPM | OXYGEN SATURATION: 96 % | DIASTOLIC BLOOD PRESSURE: 67 MMHG | SYSTOLIC BLOOD PRESSURE: 91 MMHG | SYSTOLIC BLOOD PRESSURE: 125 MMHG | HEART RATE: 68 BPM | DIASTOLIC BLOOD PRESSURE: 63 MMHG | DIASTOLIC BLOOD PRESSURE: 65 MMHG | RESPIRATION RATE: 16 BRPM | HEIGHT: 70 IN | WEIGHT: 215 LBS | HEART RATE: 81 BPM

## 2024-07-30 DIAGNOSIS — Z12.11 ENCOUNTER FOR SCREENING FOR MALIGNANT NEOPLASM OF COLON: ICD-10-CM

## 2024-07-30 DIAGNOSIS — K63.5 POLYP OF COLON: ICD-10-CM

## 2024-07-30 DIAGNOSIS — D12.0 BENIGN NEOPLASM OF CECUM: ICD-10-CM

## 2024-07-30 DIAGNOSIS — K57.30 DIVERTICULOSIS OF LARGE INTESTINE WITHOUT PERFORATION OR ABS: ICD-10-CM

## 2024-07-30 DIAGNOSIS — D12.3 BENIGN NEOPLASM OF TRANSVERSE COLON: ICD-10-CM

## 2024-07-30 DIAGNOSIS — D12.2 BENIGN NEOPLASM OF ASCENDING COLON: ICD-10-CM

## 2024-07-30 DIAGNOSIS — R19.5 OTHER FECAL ABNORMALITIES: ICD-10-CM

## 2024-07-30 LAB
GI HISTOLOGY: A. CECUM: (no result)
GI HISTOLOGY: B. ASCENDING COLON: (no result)
GI HISTOLOGY: C. TRANSVERSE COLON: (no result)
GI HISTOLOGY: D. SIGMOID COLON: (no result)
GI HISTOLOGY: PDF REPORT: (no result)

## 2024-07-30 PROCEDURE — 45385 COLONOSCOPY W/LESION REMOVAL: CPT | Mod: 33,KX | Performed by: INTERNAL MEDICINE

## 2024-07-30 PROCEDURE — 88305 TISSUE EXAM BY PATHOLOGIST: CPT | Performed by: PATHOLOGY
